# Patient Record
Sex: FEMALE | Race: WHITE | NOT HISPANIC OR LATINO | URBAN - METROPOLITAN AREA
[De-identification: names, ages, dates, MRNs, and addresses within clinical notes are randomized per-mention and may not be internally consistent; named-entity substitution may affect disease eponyms.]

---

## 2023-11-13 ENCOUNTER — APPOINTMENT (RX ONLY)
Dept: URBAN - METROPOLITAN AREA CLINIC 84 | Facility: CLINIC | Age: 63
Setting detail: DERMATOLOGY
End: 2023-11-13

## 2023-11-13 DIAGNOSIS — D69.2 OTHER NONTHROMBOCYTOPENIC PURPURA: ICD-10-CM

## 2023-11-13 DIAGNOSIS — L57.8 OTHER SKIN CHANGES DUE TO CHRONIC EXPOSURE TO NONIONIZING RADIATION: ICD-10-CM | Status: INADEQUATELY CONTROLLED

## 2023-11-13 DIAGNOSIS — L60.3 NAIL DYSTROPHY: ICD-10-CM

## 2023-11-13 DIAGNOSIS — D22 MELANOCYTIC NEVI: ICD-10-CM

## 2023-11-13 DIAGNOSIS — L57.0 ACTINIC KERATOSIS: ICD-10-CM

## 2023-11-13 DIAGNOSIS — L82.1 OTHER SEBORRHEIC KERATOSIS: ICD-10-CM

## 2023-11-13 DIAGNOSIS — D18.0 HEMANGIOMA: ICD-10-CM

## 2023-11-13 DIAGNOSIS — Z71.89 OTHER SPECIFIED COUNSELING: ICD-10-CM

## 2023-11-13 DIAGNOSIS — L81.4 OTHER MELANIN HYPERPIGMENTATION: ICD-10-CM

## 2023-11-13 DIAGNOSIS — L82.0 INFLAMED SEBORRHEIC KERATOSIS: ICD-10-CM

## 2023-11-13 DIAGNOSIS — F42.4 EXCORIATION (SKIN-PICKING) DISORDER: ICD-10-CM

## 2023-11-13 DIAGNOSIS — L85.3 XEROSIS CUTIS: ICD-10-CM | Status: INADEQUATELY CONTROLLED

## 2023-11-13 PROBLEM — D18.01 HEMANGIOMA OF SKIN AND SUBCUTANEOUS TISSUE: Status: ACTIVE | Noted: 2023-11-13

## 2023-11-13 PROBLEM — D22.5 MELANOCYTIC NEVI OF TRUNK: Status: ACTIVE | Noted: 2023-11-13

## 2023-11-13 PROBLEM — D22.72 MELANOCYTIC NEVI OF LEFT LOWER LIMB, INCLUDING HIP: Status: ACTIVE | Noted: 2023-11-13

## 2023-11-13 PROCEDURE — ? COUNSELING

## 2023-11-13 PROCEDURE — ? ADDITIONAL NOTES

## 2023-11-13 PROCEDURE — ? LIQUID NITROGEN

## 2023-11-13 PROCEDURE — 17000 DESTRUCT PREMALG LESION: CPT | Mod: 59

## 2023-11-13 PROCEDURE — ? SUNSCREEN TREATMENT REGIMEN

## 2023-11-13 PROCEDURE — 99204 OFFICE O/P NEW MOD 45 MIN: CPT | Mod: 25

## 2023-11-13 PROCEDURE — 17003 DESTRUCT PREMALG LES 2-14: CPT | Mod: 59

## 2023-11-13 PROCEDURE — 17110 DESTRUCTION B9 LES UP TO 14: CPT

## 2023-11-13 PROCEDURE — ? PRESCRIPTION MEDICATION MANAGEMENT

## 2023-11-13 PROCEDURE — ? SUNSCREEN RECOMMENDATIONS

## 2023-11-13 ASSESSMENT — LOCATION DETAILED DESCRIPTION DERM
LOCATION DETAILED: LEFT MID-UPPER BACK
LOCATION DETAILED: RIGHT PROXIMAL DORSAL FOREARM
LOCATION DETAILED: LEFT PROXIMAL DORSAL FOREARM
LOCATION DETAILED: RIGHT INFERIOR CENTRAL MALAR CHEEK
LOCATION DETAILED: RIGHT DISTAL PRETIBIAL REGION
LOCATION DETAILED: RIGHT RADIAL DORSAL HAND
LOCATION DETAILED: LEFT LATERAL ABDOMEN
LOCATION DETAILED: RIGHT PROXIMAL CALF
LOCATION DETAILED: RIGHT SUPERIOR UPPER BACK
LOCATION DETAILED: LEFT SUPERIOR LATERAL MIDBACK
LOCATION DETAILED: RIGHT ELBOW
LOCATION DETAILED: LEFT POPLITEAL SKIN
LOCATION DETAILED: LEFT INFERIOR CENTRAL MALAR CHEEK
LOCATION DETAILED: RIGHT INFERIOR UPPER BACK
LOCATION DETAILED: RIGHT KNEE
LOCATION DETAILED: LEFT RADIAL DORSAL HAND
LOCATION DETAILED: RIGHT DISTAL DORSAL FOREARM
LOCATION DETAILED: LEFT POSTERIOR SHOULDER
LOCATION DETAILED: LEFT SUPERIOR UPPER BACK
LOCATION DETAILED: RIGHT POSTERIOR SHOULDER
LOCATION DETAILED: RIGHT PROXIMAL PRETIBIAL REGION
LOCATION DETAILED: SUBXIPHOID
LOCATION DETAILED: RIGHT CENTRAL MALAR CHEEK
LOCATION DETAILED: LEFT INFERIOR ANTERIOR NECK
LOCATION DETAILED: LEFT ANTERIOR DISTAL THIGH
LOCATION DETAILED: LEFT MEDIAL SUPERIOR CHEST
LOCATION DETAILED: RIGHT LATERAL ABDOMEN
LOCATION DETAILED: LEFT 2ND TOENAIL
LOCATION DETAILED: LEFT MEDIAL MALAR CHEEK
LOCATION DETAILED: RIGHT ANTERIOR DISTAL UPPER ARM
LOCATION DETAILED: LEFT DISTAL PRETIBIAL REGION
LOCATION DETAILED: RIGHT FOREHEAD
LOCATION DETAILED: RIGHT INFERIOR FOREHEAD
LOCATION DETAILED: RIGHT CLAVICULAR NECK
LOCATION DETAILED: RIGHT DISTAL POSTERIOR UPPER ARM
LOCATION DETAILED: MID POSTERIOR NECK
LOCATION DETAILED: LEFT INFERIOR FOREHEAD
LOCATION DETAILED: RIGHT INFERIOR ANTERIOR NECK
LOCATION DETAILED: RIGHT SUPERIOR LATERAL UPPER BACK
LOCATION DETAILED: RIGHT LATERAL SUPERIOR CHEST
LOCATION DETAILED: LEFT MEDIAL FOREHEAD
LOCATION DETAILED: LEFT CENTRAL MALAR CHEEK
LOCATION DETAILED: RIGHT MID-UPPER BACK
LOCATION DETAILED: LEFT FOREHEAD
LOCATION DETAILED: LEFT DISTAL DORSAL FOREARM
LOCATION DETAILED: RIGHT 2ND TOENAIL

## 2023-11-13 ASSESSMENT — LOCATION SIMPLE DESCRIPTION DERM
LOCATION SIMPLE: ABDOMEN
LOCATION SIMPLE: LEFT 2ND TOE
LOCATION SIMPLE: RIGHT HAND
LOCATION SIMPLE: LEFT UPPER BACK
LOCATION SIMPLE: RIGHT POSTERIOR UPPER ARM
LOCATION SIMPLE: LEFT ANTERIOR NECK
LOCATION SIMPLE: RIGHT FOREARM
LOCATION SIMPLE: RIGHT CALF
LOCATION SIMPLE: RIGHT ANTERIOR NECK
LOCATION SIMPLE: RIGHT FOREHEAD
LOCATION SIMPLE: LEFT FOREARM
LOCATION SIMPLE: RIGHT UPPER ARM
LOCATION SIMPLE: RIGHT CHEEK
LOCATION SIMPLE: RIGHT KNEE
LOCATION SIMPLE: RIGHT 2ND TOE
LOCATION SIMPLE: CHEST
LOCATION SIMPLE: LEFT CHEEK
LOCATION SIMPLE: LEFT THIGH
LOCATION SIMPLE: RIGHT SHOULDER
LOCATION SIMPLE: POSTERIOR NECK
LOCATION SIMPLE: RIGHT ELBOW
LOCATION SIMPLE: RIGHT BACK
LOCATION SIMPLE: LEFT POPLITEAL SKIN
LOCATION SIMPLE: LEFT SHOULDER
LOCATION SIMPLE: LEFT LOWER BACK
LOCATION SIMPLE: LEFT FOREHEAD
LOCATION SIMPLE: RIGHT UPPER BACK
LOCATION SIMPLE: RIGHT PRETIBIAL REGION
LOCATION SIMPLE: LEFT PRETIBIAL REGION
LOCATION SIMPLE: LEFT HAND

## 2023-11-13 ASSESSMENT — LOCATION ZONE DERM
LOCATION ZONE: LEG
LOCATION ZONE: TOENAIL
LOCATION ZONE: TRUNK
LOCATION ZONE: ARM
LOCATION ZONE: FACE
LOCATION ZONE: HAND
LOCATION ZONE: NECK

## 2023-11-13 NOTE — PROCEDURE: ADDITIONAL NOTES
Additional Notes: Skin tear
Detail Level: Simple
Render Risk Assessment In Note?: no
Additional Notes: Patient would like to be scheduled for cosmetic chemical peel with Kin Jacome. \\nDiscussed Botox as well
Additional Notes: Chemical peel in the future, recheck at next exam. Pt was hesistant to treat with topical chemo cream vs LN2 due to risk of scarring. She understands the symptoms can worsen over time.  Photos taken\\npt will schedule asap today for tx options
Additional Notes: Patient to grow nail out, remove polish. Clipping in reserve to rule out onychomycosis.

## 2023-11-13 NOTE — PROCEDURE: LIQUID NITROGEN
Consent: The patient's consent was obtained including but not limited to risks of crusting, scabbing, blistering, scarring, darker or lighter pigmentary change, recurrence, incomplete removal and infection.
Medical Necessity Information: It is in your best interest to select a reason for this procedure from the list below. All of these items fulfill various CMS LCD requirements except the new and changing color options.
Add 52 Modifier (Optional): no
Duration Of Freeze Thaw-Cycle (Seconds): 4
Detail Level: Detailed
Show Topical Anesthesia Variable?: Yes
Application Tool (Optional): Liquid Nitrogen Sprayer
Number Of Freeze-Thaw Cycles: 2 freeze-thaw cycles
Spray Paint Text: The liquid nitrogen was applied to the skin utilizing a spray paint frosting technique.
Medical Necessity Clause: This procedure was medically necessary because the lesions that were treated were:
Post-Care Instructions: I reviewed with the patient in detail post-care instructions. Patient is to wear sunprotection, and avoid picking at any of the treated lesions. Pt may apply Aquaphor or Cetaphil ointment to crusted or scabbing areas. Pt was cautioned on risk of scarring, inadequate response, & the need for additional treatment s/p LN2 today.
Post-Care Instructions: I reviewed with the patient in detail post-care instructions. Patient is to wear sunprotection, and avoid picking at any of the treated lesions. Pt may apply aquaphor or cetaphil ointment to crusted or scabbing areas.  FU in 1 month to reassess and discuss further treatment options if not resolved

## 2023-11-14 ENCOUNTER — APPOINTMENT (RX ONLY)
Dept: URBAN - METROPOLITAN AREA CLINIC 84 | Facility: CLINIC | Age: 63
Setting detail: DERMATOLOGY
End: 2023-11-14

## 2023-11-14 DIAGNOSIS — Z41.9 ENCOUNTER FOR PROCEDURE FOR PURPOSES OTHER THAN REMEDYING HEALTH STATE, UNSPECIFIED: ICD-10-CM

## 2023-11-14 PROCEDURE — ? COSMETIC QUOTE

## 2023-11-14 PROCEDURE — ? COSMETIC CONSULTATION: CHEMICAL PEELS

## 2023-11-14 PROCEDURE — ? COSMETIC CONSULTATION: LASER RESURFACING

## 2023-11-14 PROCEDURE — ? COSMETIC CONSULTATION: THREAD LIFT

## 2023-11-14 ASSESSMENT — LOCATION DETAILED DESCRIPTION DERM: LOCATION DETAILED: LEFT CENTRAL MALAR CHEEK

## 2023-11-14 ASSESSMENT — LOCATION ZONE DERM: LOCATION ZONE: FACE

## 2023-11-14 ASSESSMENT — LOCATION SIMPLE DESCRIPTION DERM: LOCATION SIMPLE: LEFT CHEEK

## 2023-11-14 NOTE — HPI: COSMETIC CONSULTATION
Additional History: Patient has been receiving annual chemical peels. Started with Dympol. Most recent peel.  TCA 20% chemical peels

## 2023-11-14 NOTE — PROCEDURE: COSMETIC QUOTE
Face Procedure 9 Free Text Discount (In Dollars- Use Only Numbers And Decimals): 0.00
Breast Procedure 9 Percentage Discount: 0
Injectable  12 Price/Unit (In Dollars- Use Only Numbers And Decimals): 12.00
Injectable 5: Volbella 0.55cc
Laser 6 Price/Unit (In Dollars- Use Only Numbers And Decimals): 1500.00
Misc Procedure 3 Price/Unit (In Dollars- Use Only Numbers And Decimals): 10.00
Laser 10: CO2RE Intima
Include Sales Tax: No
Laser 17 Price/Unit (In Dollars- Use Only Numbers And Decimals): 1000.00
Misc Procedure 6: Threads Browlift
Include Sales Tax On Surgeon's Fees: Yes
Injectable 2: Versa
Injectable 9 Price/Unit (In Dollars- Use Only Numbers And Decimals): 800.00
Laser 3 Price/Unit (In Dollars- Use Only Numbers And Decimals): 1800.00
Injectable  13: Kybella/Vial
Misc Procedure 4: Threads Neck Lift
Laser 14 Price/Unit (In Dollars- Use Only Numbers And Decimals): 250.00
Laser 7: VV/PPP on Genitals
Laser 18: LHR-Legs/Back-TBD
Injectable 6 Price/Unit (In Dollars- Use Only Numbers And Decimals): 795.00
Injectable 10: RHA 3
Misc Procedure 7 Price/Unit (In Dollars- Use Only Numbers And Decimals): 50.00
Laser 11 Price/Unit (In Dollars- Use Only Numbers And Decimals): 500.00
Misc Procedure 1: Threadlift Per Thread
Laser 4: Full Face-Light
Laser 15: Voncile Hearing
Additional Note (Will Following Above Verbiage): $5907-8342 depending on how deep patient would like to treat.  \\nCO2RE on sale $3209 (with skin care products and PRP)\\n$500 for neck \\n\\nRecommend starting with laser treatment followed by annual chemical peels for maintenance ( wait 4-6 weeks for chemical peel) \\n\\nChemical Peel - TCA with Jessner Solution. (Dec. 5th in [facility])\\n\\nDiscussed threads for lift in cheeks\\nAnd collagen stimulating threads
Laser 4 Price/Unit (In Dollars- Use Only Numbers And Decimals): 1250.00
Misc Procedure 1 Price/Unit (In Dollars- Use Only Numbers And Decimals): 225.00
Injectable  14: Qwo/Session
Laser 15 Price/Unit (In Dollars- Use Only Numbers And Decimals): 300.00
Misc Procedure 5: Threads Eyelift
Laser 8: Profound-Cheek Scarring
Laser 19: KEATON LUA
Laser 1 Price/Unit (In Dollars- Use Only Numbers And Decimals): 2500.00
Injectable 7 Price/Unit (In Dollars- Use Only Numbers And Decimals): 700.00
Injectable  11: Botox
Laser 12 Price/Unit (In Dollars- Use Only Numbers And Decimals): 900.00
Misc Procedure 2: Collagen threads-Lip - lips
Laser 5: Lesion Removal
Laser 16: My
Face Procedure 2: punch excision-lip
Injectable 8: Darrian Mckeon
Laser 2: Full Face and Neck-Fusion
Laser 13: IPL Photofacial-Face, Neck, Chest
Injectable 1 Price/Unit (In Dollars- Use Only Numbers And Decimals): 750.00
Anesthesia Fee Units (Optional): 1
Misc Procedure 3: Collagen Stimulating threads Per thread only
Laser 17: LHR-Arms
Injectable 5 Price/Unit (In Dollars- Use Only Numbers And Decimals): 395.00
Injectable 9: RHA 4
Laser 3: Full Face-Mid Depth
Laser 14: LHR-Lip
Injectable 2 Basurto/Unit (In Dollars- Use Only Numbers And Decimals): 600.00
Detail Level: Zone
Injectable 6: Volbella 1cc
Laser 11: IPL Photofacial
Misc Procedure 7: [address]
Laser 18 Price/Unit (In Dollars- Use Only Numbers And Decimals): 2000.00
Injectable 3: Voluma
Laser 1: Full Face-Fusion -CO2RE
Face Procedure 1: TCA Chemical Peel
Injectable 7: Vineet Francisco
Misc Procedure 1 Units: 8
Misc Procedure 8: Thread for pox scarring-cheeks
Laser 12: IPL Photoface-Face and Neck
Injectable 4: Volux
Misc Procedure 2 Price/Unit (In Dollars- Use Only Numbers And Decimals): 200.00
Laser 16 Price/Unit (In Dollars- Use Only Numbers And Decimals): 400.00
Laser 9: CO2RE- Pox scarring-cheeks
Laser 2 Price/Unit (In Dollars- Use Only Numbers And Decimals): 3000.00
Injectable 1: Radiesse
Injectable  12: Xeomin
Laser 6: Sk Removal-Face and Neck

## 2023-11-28 ENCOUNTER — APPOINTMENT (RX ONLY)
Dept: URBAN - METROPOLITAN AREA CLINIC 84 | Facility: CLINIC | Age: 63
Setting detail: DERMATOLOGY
End: 2023-11-28

## 2023-11-28 DIAGNOSIS — Z41.9 ENCOUNTER FOR PROCEDURE FOR PURPOSES OTHER THAN REMEDYING HEALTH STATE, UNSPECIFIED: ICD-10-CM

## 2023-11-28 PROCEDURE — ? CHEMICAL PEEL JESSNER/TCA

## 2023-11-28 PROCEDURE — ? ADDITIONAL NOTES

## 2023-11-28 NOTE — PROCEDURE: ADDITIONAL NOTES
Additional Notes: Jessner Solution \\nExp  lot 2101\\n\\nTCA 20%\\nLot [id number]\\nExp 01/19/2024
Render Risk Assessment In Note?: no
Detail Level: Simple

## 2023-11-28 NOTE — PROCEDURE: CHEMICAL PEEL JESSNER/TCA
Post-Care Instructions: I reviewed with the patient in detail post-care instructions. Patient should avoid sun exposure and wear sun protection.
Consent: Prior to the procedure, written consent was obtained and risks were reviewed, including but not limited to: redness, peeling, blistering, pigmentary change, scarring, infection, and pain.
Detail Level: Zone
Frost (0,1+,2+,3+,4+): 0
Post Peel Care: After the procedure, the treatment area was washed with soap and water, and a post-peel cream was applied. Sun protection and post-care instructions were reviewed with the patient.
Prep: The treated area was degreased with pre-peel cleanser, and vaseline was applied for protection of mucous membranes.
Number Of Coats: 2
Price (Use Numbers Only, No Special Characters Or $): 300.00
Treatment Number: 1
Chemical Peel: Jessners/20% TCA